# Patient Record
Sex: FEMALE | Race: WHITE | NOT HISPANIC OR LATINO | Employment: OTHER | URBAN - METROPOLITAN AREA
[De-identification: names, ages, dates, MRNs, and addresses within clinical notes are randomized per-mention and may not be internally consistent; named-entity substitution may affect disease eponyms.]

---

## 2017-03-21 ENCOUNTER — APPOINTMENT (OUTPATIENT)
Dept: LAB | Facility: CLINIC | Age: 82
End: 2017-03-21
Payer: MEDICARE

## 2017-03-21 ENCOUNTER — TRANSCRIBE ORDERS (OUTPATIENT)
Dept: LAB | Facility: CLINIC | Age: 82
End: 2017-03-21

## 2017-03-21 DIAGNOSIS — I10 ESSENTIAL HYPERTENSION, BENIGN: Primary | ICD-10-CM

## 2017-03-21 DIAGNOSIS — E55.9 UNSPECIFIED VITAMIN D DEFICIENCY: ICD-10-CM

## 2017-03-21 DIAGNOSIS — T45.1X5A ANEMIA DUE TO CHEMOTHERAPY: ICD-10-CM

## 2017-03-21 DIAGNOSIS — D64.81 ANEMIA DUE TO CHEMOTHERAPY: ICD-10-CM

## 2017-03-21 LAB
25(OH)D3 SERPL-MCNC: 12.5 NG/ML (ref 30–100)
ALT SERPL W P-5'-P-CCNC: 16 U/L (ref 12–78)
ANION GAP SERPL CALCULATED.3IONS-SCNC: 6 MMOL/L (ref 4–13)
BUN SERPL-MCNC: 21 MG/DL (ref 5–25)
CALCIUM SERPL-MCNC: 8.9 MG/DL (ref 8.3–10.1)
CHLORIDE SERPL-SCNC: 106 MMOL/L (ref 100–108)
CO2 SERPL-SCNC: 28 MMOL/L (ref 21–32)
CREAT SERPL-MCNC: 1.2 MG/DL (ref 0.6–1.3)
GFR SERPL CREATININE-BSD FRML MDRD: 42.8 ML/MIN/1.73SQ M
GLUCOSE P FAST SERPL-MCNC: 95 MG/DL (ref 65–99)
HCT VFR BLD AUTO: 36.3 % (ref 34.8–46.1)
HGB BLD-MCNC: 12.1 G/DL (ref 11.5–15.4)
POTASSIUM SERPL-SCNC: 4.2 MMOL/L (ref 3.5–5.3)
SODIUM SERPL-SCNC: 140 MMOL/L (ref 136–145)

## 2017-03-21 PROCEDURE — 82306 VITAMIN D 25 HYDROXY: CPT

## 2017-03-21 PROCEDURE — 80048 BASIC METABOLIC PNL TOTAL CA: CPT

## 2017-03-21 PROCEDURE — 85014 HEMATOCRIT: CPT

## 2017-03-21 PROCEDURE — 36415 COLL VENOUS BLD VENIPUNCTURE: CPT

## 2017-03-21 PROCEDURE — 84460 ALANINE AMINO (ALT) (SGPT): CPT

## 2017-03-21 PROCEDURE — 85018 HEMOGLOBIN: CPT

## 2017-07-10 ENCOUNTER — APPOINTMENT (OUTPATIENT)
Dept: LAB | Facility: CLINIC | Age: 82
End: 2017-07-10
Payer: MEDICARE

## 2017-07-10 ENCOUNTER — TRANSCRIBE ORDERS (OUTPATIENT)
Dept: LAB | Facility: CLINIC | Age: 82
End: 2017-07-10

## 2017-07-10 DIAGNOSIS — D64.9 ANEMIA, UNSPECIFIED: ICD-10-CM

## 2017-07-10 DIAGNOSIS — I10 ESSENTIAL HYPERTENSION, BENIGN: ICD-10-CM

## 2017-07-10 DIAGNOSIS — J44.9 OBSTRUCTIVE CHRONIC BRONCHITIS WITHOUT EXACERBATION (HCC): ICD-10-CM

## 2017-07-10 DIAGNOSIS — E78.2 MIXED HYPERLIPIDEMIA: Primary | ICD-10-CM

## 2017-07-10 LAB
ALBUMIN SERPL BCP-MCNC: 3.6 G/DL (ref 3.5–5)
ALP SERPL-CCNC: 70 U/L (ref 46–116)
ALT SERPL W P-5'-P-CCNC: 17 U/L (ref 12–78)
ANION GAP SERPL CALCULATED.3IONS-SCNC: 4 MMOL/L (ref 4–13)
AST SERPL W P-5'-P-CCNC: 13 U/L (ref 5–45)
BILIRUB SERPL-MCNC: 0.61 MG/DL (ref 0.2–1)
BUN SERPL-MCNC: 19 MG/DL (ref 5–25)
CALCIUM SERPL-MCNC: 8.7 MG/DL (ref 8.3–10.1)
CHLORIDE SERPL-SCNC: 106 MMOL/L (ref 100–108)
CHOLEST SERPL-MCNC: 210 MG/DL (ref 50–200)
CO2 SERPL-SCNC: 27 MMOL/L (ref 21–32)
CREAT SERPL-MCNC: 1.23 MG/DL (ref 0.6–1.3)
EST. AVERAGE GLUCOSE BLD GHB EST-MCNC: 126 MG/DL
GFR SERPL CREATININE-BSD FRML MDRD: 41.6 ML/MIN/1.73SQ M
GLUCOSE P FAST SERPL-MCNC: 111 MG/DL (ref 65–99)
HBA1C MFR BLD: 6 % (ref 4.2–6.3)
HCT VFR BLD AUTO: 37.6 % (ref 34.8–46.1)
HDLC SERPL-MCNC: 60 MG/DL (ref 40–60)
HGB BLD-MCNC: 12.5 G/DL (ref 11.5–15.4)
LDLC SERPL CALC-MCNC: 118 MG/DL (ref 0–100)
POTASSIUM SERPL-SCNC: 4 MMOL/L (ref 3.5–5.3)
PROT SERPL-MCNC: 7.7 G/DL (ref 6.4–8.2)
SODIUM SERPL-SCNC: 137 MMOL/L (ref 136–145)
TRIGL SERPL-MCNC: 158 MG/DL

## 2017-07-10 PROCEDURE — 85018 HEMOGLOBIN: CPT

## 2017-07-10 PROCEDURE — 80053 COMPREHEN METABOLIC PANEL: CPT

## 2017-07-10 PROCEDURE — 36415 COLL VENOUS BLD VENIPUNCTURE: CPT

## 2017-07-10 PROCEDURE — 85014 HEMATOCRIT: CPT

## 2017-07-10 PROCEDURE — 80061 LIPID PANEL: CPT

## 2017-07-10 PROCEDURE — 83036 HEMOGLOBIN GLYCOSYLATED A1C: CPT

## 2018-08-28 ENCOUNTER — TRANSCRIBE ORDERS (OUTPATIENT)
Dept: LAB | Facility: CLINIC | Age: 83
End: 2018-08-28

## 2018-08-28 ENCOUNTER — APPOINTMENT (OUTPATIENT)
Dept: LAB | Facility: CLINIC | Age: 83
End: 2018-08-28
Payer: MEDICARE

## 2018-08-28 DIAGNOSIS — I51.9 MYXEDEMA HEART DISEASE: ICD-10-CM

## 2018-08-28 DIAGNOSIS — R73.09 IMPAIRED GLUCOSE TOLERANCE TEST: ICD-10-CM

## 2018-08-28 DIAGNOSIS — E03.9 MYXEDEMA HEART DISEASE: ICD-10-CM

## 2018-08-28 DIAGNOSIS — D63.8 CHRONIC DISEASE ANEMIA: ICD-10-CM

## 2018-08-28 DIAGNOSIS — I10 ESSENTIAL HYPERTENSION, MALIGNANT: Primary | ICD-10-CM

## 2018-08-28 LAB
ALBUMIN SERPL BCP-MCNC: 3.6 G/DL (ref 3.5–5)
ALP SERPL-CCNC: 60 U/L (ref 46–116)
ALT SERPL W P-5'-P-CCNC: 18 U/L (ref 12–78)
ANION GAP SERPL CALCULATED.3IONS-SCNC: 7 MMOL/L (ref 4–13)
AST SERPL W P-5'-P-CCNC: 14 U/L (ref 5–45)
BILIRUB SERPL-MCNC: 0.66 MG/DL (ref 0.2–1)
BUN SERPL-MCNC: 26 MG/DL (ref 5–25)
CALCIUM SERPL-MCNC: 8.9 MG/DL (ref 8.3–10.1)
CHLORIDE SERPL-SCNC: 104 MMOL/L (ref 100–108)
CHOLEST SERPL-MCNC: 205 MG/DL (ref 50–200)
CO2 SERPL-SCNC: 25 MMOL/L (ref 21–32)
CREAT SERPL-MCNC: 1.25 MG/DL (ref 0.6–1.3)
GFR SERPL CREATININE-BSD FRML MDRD: 39 ML/MIN/1.73SQ M
GLUCOSE P FAST SERPL-MCNC: 118 MG/DL (ref 65–99)
HCT VFR BLD AUTO: 41.1 % (ref 34.8–46.1)
HDLC SERPL-MCNC: 59 MG/DL (ref 40–60)
HGB BLD-MCNC: 13.1 G/DL (ref 11.5–15.4)
LDLC SERPL CALC-MCNC: 117 MG/DL (ref 0–100)
NONHDLC SERPL-MCNC: 146 MG/DL
POTASSIUM SERPL-SCNC: 4.3 MMOL/L (ref 3.5–5.3)
PROT SERPL-MCNC: 7.7 G/DL (ref 6.4–8.2)
SODIUM SERPL-SCNC: 136 MMOL/L (ref 136–145)
TRIGL SERPL-MCNC: 147 MG/DL
TSH SERPL DL<=0.05 MIU/L-ACNC: 1.36 UIU/ML (ref 0.36–3.74)

## 2018-08-28 PROCEDURE — 84443 ASSAY THYROID STIM HORMONE: CPT

## 2018-08-28 PROCEDURE — 80053 COMPREHEN METABOLIC PANEL: CPT

## 2018-08-28 PROCEDURE — 80061 LIPID PANEL: CPT

## 2018-08-28 PROCEDURE — 36415 COLL VENOUS BLD VENIPUNCTURE: CPT

## 2018-08-28 PROCEDURE — 85018 HEMOGLOBIN: CPT

## 2018-08-28 PROCEDURE — 85014 HEMATOCRIT: CPT

## 2019-07-23 ENCOUNTER — APPOINTMENT (OUTPATIENT)
Dept: LAB | Facility: CLINIC | Age: 84
End: 2019-07-23
Payer: MEDICARE

## 2019-07-23 ENCOUNTER — TRANSCRIBE ORDERS (OUTPATIENT)
Dept: LAB | Facility: CLINIC | Age: 84
End: 2019-07-23

## 2019-07-23 DIAGNOSIS — I10 ESSENTIAL HYPERTENSION, MALIGNANT: Primary | ICD-10-CM

## 2019-07-23 DIAGNOSIS — E78.00 PURE HYPERCHOLESTEROLEMIA: ICD-10-CM

## 2019-07-23 DIAGNOSIS — D50.0 IRON DEFICIENCY ANEMIA SECONDARY TO BLOOD LOSS (CHRONIC): ICD-10-CM

## 2019-07-23 DIAGNOSIS — E13.8 DIABETES MELLITUS OF OTHER TYPE WITH COMPLICATION, UNSPECIFIED WHETHER LONG TERM INSULIN USE: ICD-10-CM

## 2019-07-23 LAB
ALBUMIN SERPL BCP-MCNC: 3.7 G/DL (ref 3.5–5)
ALP SERPL-CCNC: 57 U/L (ref 46–116)
ALT SERPL W P-5'-P-CCNC: 15 U/L (ref 12–78)
ANION GAP SERPL CALCULATED.3IONS-SCNC: 5 MMOL/L (ref 4–13)
AST SERPL W P-5'-P-CCNC: 12 U/L (ref 5–45)
BILIRUB SERPL-MCNC: 0.68 MG/DL (ref 0.2–1)
BUN SERPL-MCNC: 21 MG/DL (ref 5–25)
CALCIUM SERPL-MCNC: 9 MG/DL (ref 8.3–10.1)
CHLORIDE SERPL-SCNC: 103 MMOL/L (ref 100–108)
CHOLEST SERPL-MCNC: 191 MG/DL (ref 50–200)
CO2 SERPL-SCNC: 28 MMOL/L (ref 21–32)
CREAT SERPL-MCNC: 1.05 MG/DL (ref 0.6–1.3)
CREAT UR-MCNC: 32 MG/DL
EST. AVERAGE GLUCOSE BLD GHB EST-MCNC: 117 MG/DL
GFR SERPL CREATININE-BSD FRML MDRD: 48 ML/MIN/1.73SQ M
GLUCOSE P FAST SERPL-MCNC: 111 MG/DL (ref 65–99)
HBA1C MFR BLD: 5.7 % (ref 4.2–6.3)
HDLC SERPL-MCNC: 78 MG/DL (ref 40–60)
HGB BLD-MCNC: 12.9 G/DL (ref 11.5–15.4)
LDLC SERPL CALC-MCNC: 96 MG/DL (ref 0–100)
MICROALBUMIN UR-MCNC: <5 MG/L (ref 0–20)
MICROALBUMIN/CREAT 24H UR: <16 MG/G CREATININE (ref 0–30)
NONHDLC SERPL-MCNC: 113 MG/DL
POTASSIUM SERPL-SCNC: 4.2 MMOL/L (ref 3.5–5.3)
PROT SERPL-MCNC: 7.3 G/DL (ref 6.4–8.2)
SODIUM SERPL-SCNC: 136 MMOL/L (ref 136–145)
TRIGL SERPL-MCNC: 83 MG/DL

## 2019-07-23 PROCEDURE — 85018 HEMOGLOBIN: CPT

## 2019-07-23 PROCEDURE — 80061 LIPID PANEL: CPT

## 2019-07-23 PROCEDURE — 36415 COLL VENOUS BLD VENIPUNCTURE: CPT

## 2019-07-23 PROCEDURE — 83036 HEMOGLOBIN GLYCOSYLATED A1C: CPT

## 2019-07-23 PROCEDURE — 82570 ASSAY OF URINE CREATININE: CPT

## 2019-07-23 PROCEDURE — 82043 UR ALBUMIN QUANTITATIVE: CPT

## 2019-07-23 PROCEDURE — 80053 COMPREHEN METABOLIC PANEL: CPT

## 2019-10-07 PROBLEM — J31.0 CHRONIC RHINITIS: Status: ACTIVE | Noted: 2019-10-07

## 2019-10-07 PROBLEM — N18.30 CKD (CHRONIC KIDNEY DISEASE) STAGE 3, GFR 30-59 ML/MIN (HCC): Status: ACTIVE | Noted: 2019-10-07

## 2019-10-07 PROBLEM — E55.9 VITAMIN D DEFICIENCY: Status: ACTIVE | Noted: 2019-10-07

## 2019-10-07 PROBLEM — M15.9 DJD (DEGENERATIVE JOINT DISEASE), MULTIPLE SITES: Status: ACTIVE | Noted: 2019-10-07

## 2019-10-07 PROBLEM — E78.00 HYPERCHOLESTEREMIA: Status: ACTIVE | Noted: 2019-10-07

## 2019-10-07 PROBLEM — R76.8 ELEVATED RHEUMATOID FACTOR: Status: ACTIVE | Noted: 2019-10-07

## 2019-10-07 PROBLEM — H90.3 SENSORINEURAL HEARING LOSS (SNHL) OF BOTH EARS: Status: ACTIVE | Noted: 2019-10-07

## 2019-10-07 PROBLEM — E74.39 GLUCOSE INTOLERANCE: Status: ACTIVE | Noted: 2019-10-07

## 2019-10-07 PROBLEM — D63.8 ANEMIA, CHRONIC DISEASE: Status: ACTIVE | Noted: 2019-10-07

## 2019-10-25 PROBLEM — J06.9 VIRAL UPPER RESPIRATORY TRACT INFECTION: Status: ACTIVE | Noted: 2019-10-25

## 2019-10-25 PROBLEM — J45.41 MODERATE PERSISTENT ASTHMA WITH ACUTE EXACERBATION: Status: ACTIVE | Noted: 2019-10-25

## 2020-05-18 PROBLEM — I10 ESSENTIAL HYPERTENSION: Status: ACTIVE | Noted: 2020-05-18

## 2020-05-18 PROBLEM — R48.1: Status: ACTIVE | Noted: 2020-05-18

## 2020-05-18 PROBLEM — J06.9 VIRAL UPPER RESPIRATORY TRACT INFECTION: Status: RESOLVED | Noted: 2019-10-25 | Resolved: 2020-05-18

## 2020-12-04 LAB — HBA1C MFR BLD HPLC: 5.9 %

## 2021-02-05 ENCOUNTER — TELEMEDICINE (OUTPATIENT)
Dept: INTERNAL MEDICINE CLINIC | Facility: CLINIC | Age: 86
End: 2021-02-05
Payer: MEDICARE

## 2021-02-05 VITALS
SYSTOLIC BLOOD PRESSURE: 150 MMHG | HEIGHT: 60 IN | RESPIRATION RATE: 18 BRPM | BODY MASS INDEX: 28.47 KG/M2 | DIASTOLIC BLOOD PRESSURE: 60 MMHG | HEART RATE: 65 BPM | WEIGHT: 145 LBS

## 2021-02-05 DIAGNOSIS — J45.41 MODERATE PERSISTENT ASTHMA WITH ACUTE EXACERBATION: ICD-10-CM

## 2021-02-05 DIAGNOSIS — J31.0 CHRONIC RHINITIS: ICD-10-CM

## 2021-02-05 DIAGNOSIS — R26.9 NEUROLOGIC GAIT DYSFUNCTION: ICD-10-CM

## 2021-02-05 DIAGNOSIS — I10 ESSENTIAL HYPERTENSION: Primary | ICD-10-CM

## 2021-02-05 DIAGNOSIS — H90.3 SENSORINEURAL HEARING LOSS (SNHL) OF BOTH EARS: ICD-10-CM

## 2021-02-05 DIAGNOSIS — D63.8 ANEMIA, CHRONIC DISEASE: ICD-10-CM

## 2021-02-05 DIAGNOSIS — S72.002D CLOSED FRACTURE OF LEFT HIP WITH ROUTINE HEALING: ICD-10-CM

## 2021-02-05 DIAGNOSIS — M80.00XD AGE-RELATED OSTEOPOROSIS WITH CURRENT PATHOLOGICAL FRACTURE WITH ROUTINE HEALING, SUBSEQUENT ENCOUNTER: ICD-10-CM

## 2021-02-05 DIAGNOSIS — N18.30 STAGE 3 CHRONIC KIDNEY DISEASE, UNSPECIFIED WHETHER STAGE 3A OR 3B CKD (HCC): ICD-10-CM

## 2021-02-05 PROBLEM — M80.00XA AGE-RELATED OSTEOPOROSIS WITH CURRENT PATHOLOGICAL FRACTURE: Status: ACTIVE | Noted: 2021-02-05

## 2021-02-05 PROCEDURE — 99442 PR PHYS/QHP TELEPHONE EVALUATION 11-20 MIN: CPT | Performed by: INTERNAL MEDICINE

## 2021-02-05 RX ORDER — MELATONIN
2000 DAILY
COMMUNITY

## 2021-02-05 NOTE — PROGRESS NOTES
Virtual Brief Visit    Assessment/Plan:generally fair s/p hip fracture, recovering, HTN fair BS fair Chol for diet  Asthma stable, generally fair  Coming back to this area in 2 months    Advised to get copy of blood test report from the her MD there    rx for osteoporosis disucssed    Problem List Items Addressed This Visit        Cardiovascular and Mediastinum    Essential hypertension - Primary     BP was high, now better            Nervous and Auditory    Sensorineural hearing loss (SNHL) of both ears       Musculoskeletal and Integument    Age-related osteoporosis with current pathological fracture     Rec calcium 1000 mg daily and vit d3 2000 units daily         Closed fracture of left hip with routine healing     Continue Therapy            Genitourinary    CKD (chronic kidney disease) stage 3, GFR 30-59 ml/min       Other    Neurologic gait dysfunction     Pt is getting therapy at home for 6 months and will be going for outpatient                     Reason for visit is   Chief Complaint   Patient presents with    Hypertension    Hyperlipidemia    Diabetes    Virtual Brief Visit        Encounter provider Leonard Sanders MD    Provider located at [address] [id number]    Recent Visits  No visits were found meeting these conditions  Showing recent visits within past 7 days and meeting all other requirements     Today's Visits  Date Type Provider Dept   02/05/21 Telemedicine Leonard Sanders MD [facility] Internal Med   Showing today's visits and meeting all other requirements     Future Appointments  No visits were found meeting these conditions  Showing future appointments within next 150 days and meeting all other requirements        After connecting through telephone, the patient was identified by name and date of birth   Jayro Elyola was informed that this is a telemedicine visit and that the visit is being conducted through telephone  My office door was closed  No one else was in the room  She acknowledged consent and understanding of privacy and security of the platform  The patient has agreed to participate and understands she can discontinue the visit at any time  Patient is aware this is a billable service  Subjective    Leta Maya is a 80 y o  female chronic disease management  HPI     History reviewed  No pertinent past medical history  History reviewed  No pertinent surgical history  Current Outpatient Medications   Medication Sig Dispense Refill    ADVAIR DISKUS 250-50 MCG/DOSE inhaler USE 1 PUFF BY MOUTH TWO  TIMES DAILY 180 each 1    ALPRAZolam (XANAX) 0 5 mg tablet Take 1 tablet (0 5 mg total) by mouth 2 (two) times a day as needed for anxiety 180 tablet 1    cholecalciferol (VITAMIN D3) 1,000 units tablet Take 2,000 Units by mouth daily      lisinopril (ZESTRIL) 20 mg tablet Take 1 tablet (20 mg total) by mouth daily 90 tablet 1    metoprolol tartrate (LOPRESSOR) 100 mg tablet TAKE 1 TABLET BY MOUTH  EVERY 12 HOURS (Patient taking differently: Half in AM and One in PM ( 50 mg daily AM and 100 mg daily PM)) 180 tablet 1    montelukast (SINGULAIR) 10 mg tablet Take 1 tablet (10 mg total) by mouth daily at bedtime 90 tablet 1     No current facility-administered medications for this visit  No Known Allergies    Review of Systems    Vitals:    02/05/21 1337   BP: 150/60   Pulse: 65   Resp: 18   Weight: 65 8 kg (145 lb)   Height: 5' (1 524 m)         I spent 15 minutes directly with the patient during this visit    VIRTUAL VISIT DISCLAIMER    Leta Maya acknowledges that she has consented to an online visit or consultation   She understands that the online visit is based solely on information provided by her, and that, in the absence of a face-to-face physical evaluation by the physician, the diagnosis she receives is both limited and provisional in terms of accuracy and completeness  This is not intended to replace a full medical face-to-face evaluation by the physician  Mary Johnson understands and accepts these terms

## 2021-05-17 DIAGNOSIS — I10 ESSENTIAL HYPERTENSION, MALIGNANT: Primary | ICD-10-CM

## 2021-05-17 DIAGNOSIS — R73.02 IMPAIRED GLUCOSE TOLERANCE ASSOCIATED WITH DRUGS: ICD-10-CM

## 2021-05-17 DIAGNOSIS — E78.00 PURE HYPERCHOLESTEROLEMIA: ICD-10-CM

## 2021-05-17 DIAGNOSIS — T50.905A IMPAIRED GLUCOSE TOLERANCE ASSOCIATED WITH DRUGS: ICD-10-CM

## 2021-05-19 ENCOUNTER — APPOINTMENT (OUTPATIENT)
Dept: LAB | Facility: CLINIC | Age: 86
End: 2021-05-19
Payer: MEDICARE

## 2021-05-19 DIAGNOSIS — R73.02 IMPAIRED GLUCOSE TOLERANCE ASSOCIATED WITH DRUGS: ICD-10-CM

## 2021-05-19 DIAGNOSIS — I10 ESSENTIAL HYPERTENSION, MALIGNANT: ICD-10-CM

## 2021-05-19 DIAGNOSIS — T50.905A IMPAIRED GLUCOSE TOLERANCE ASSOCIATED WITH DRUGS: ICD-10-CM

## 2021-05-19 DIAGNOSIS — E78.00 PURE HYPERCHOLESTEROLEMIA: ICD-10-CM

## 2021-05-19 LAB
ALBUMIN SERPL BCP-MCNC: 3.8 G/DL (ref 3.5–5)
ALP SERPL-CCNC: 66 U/L (ref 46–116)
ALT SERPL W P-5'-P-CCNC: 15 U/L (ref 12–78)
ANION GAP SERPL CALCULATED.3IONS-SCNC: 5 MMOL/L (ref 4–13)
AST SERPL W P-5'-P-CCNC: 10 U/L (ref 5–45)
BILIRUB SERPL-MCNC: 0.66 MG/DL (ref 0.2–1)
BUN SERPL-MCNC: 29 MG/DL (ref 5–25)
CALCIUM SERPL-MCNC: 9.6 MG/DL (ref 8.3–10.1)
CHLORIDE SERPL-SCNC: 107 MMOL/L (ref 100–108)
CHOLEST SERPL-MCNC: 229 MG/DL (ref 50–200)
CO2 SERPL-SCNC: 27 MMOL/L (ref 21–32)
CREAT SERPL-MCNC: 0.99 MG/DL (ref 0.6–1.3)
EST. AVERAGE GLUCOSE BLD GHB EST-MCNC: 114 MG/DL
GFR SERPL CREATININE-BSD FRML MDRD: 51 ML/MIN/1.73SQ M
GLUCOSE P FAST SERPL-MCNC: 96 MG/DL (ref 65–99)
HBA1C MFR BLD: 5.6 %
HDLC SERPL-MCNC: 75 MG/DL
LDLC SERPL CALC-MCNC: 123 MG/DL (ref 0–100)
POTASSIUM SERPL-SCNC: 4.7 MMOL/L (ref 3.5–5.3)
PROT SERPL-MCNC: 7.8 G/DL (ref 6.4–8.2)
SODIUM SERPL-SCNC: 139 MMOL/L (ref 136–145)
TRIGL SERPL-MCNC: 155 MG/DL

## 2021-05-19 PROCEDURE — 83036 HEMOGLOBIN GLYCOSYLATED A1C: CPT

## 2021-05-19 PROCEDURE — 36415 COLL VENOUS BLD VENIPUNCTURE: CPT

## 2021-05-19 PROCEDURE — 80053 COMPREHEN METABOLIC PANEL: CPT

## 2021-05-19 PROCEDURE — 80061 LIPID PANEL: CPT

## 2021-05-25 ENCOUNTER — OFFICE VISIT (OUTPATIENT)
Dept: INTERNAL MEDICINE CLINIC | Facility: CLINIC | Age: 86
End: 2021-05-25
Payer: MEDICARE

## 2021-05-25 VITALS
BODY MASS INDEX: 30.23 KG/M2 | WEIGHT: 154 LBS | HEART RATE: 62 BPM | OXYGEN SATURATION: 99 % | HEIGHT: 60 IN | DIASTOLIC BLOOD PRESSURE: 60 MMHG | SYSTOLIC BLOOD PRESSURE: 152 MMHG | TEMPERATURE: 97.3 F

## 2021-05-25 DIAGNOSIS — N18.31 STAGE 3A CHRONIC KIDNEY DISEASE (HCC): ICD-10-CM

## 2021-05-25 DIAGNOSIS — E55.9 VITAMIN D DEFICIENCY: ICD-10-CM

## 2021-05-25 DIAGNOSIS — H90.3 SENSORINEURAL HEARING LOSS (SNHL) OF BOTH EARS: Primary | ICD-10-CM

## 2021-05-25 DIAGNOSIS — J45.40 MODERATE PERSISTENT ASTHMA, UNSPECIFIED WHETHER COMPLICATED: ICD-10-CM

## 2021-05-25 DIAGNOSIS — K21.9 GASTROESOPHAGEAL REFLUX DISEASE WITHOUT ESOPHAGITIS: ICD-10-CM

## 2021-05-25 DIAGNOSIS — R76.8 ELEVATED RHEUMATOID FACTOR: ICD-10-CM

## 2021-05-25 DIAGNOSIS — M15.9 PRIMARY OSTEOARTHRITIS INVOLVING MULTIPLE JOINTS: ICD-10-CM

## 2021-05-25 DIAGNOSIS — F41.1 GAD (GENERALIZED ANXIETY DISORDER): ICD-10-CM

## 2021-05-25 DIAGNOSIS — E78.00 HYPERCHOLESTEREMIA: ICD-10-CM

## 2021-05-25 DIAGNOSIS — E74.39 GLUCOSE INTOLERANCE: ICD-10-CM

## 2021-05-25 DIAGNOSIS — J31.0 CHRONIC RHINITIS: ICD-10-CM

## 2021-05-25 DIAGNOSIS — I10 ESSENTIAL HYPERTENSION: ICD-10-CM

## 2021-05-25 PROBLEM — J45.909 MODERATE ASTHMA: Status: ACTIVE | Noted: 2021-05-25

## 2021-05-25 PROBLEM — J45.41 MODERATE PERSISTENT ASTHMA WITH ACUTE EXACERBATION: Status: RESOLVED | Noted: 2019-10-25 | Resolved: 2021-05-25

## 2021-05-25 PROCEDURE — 99214 OFFICE O/P EST MOD 30 MIN: CPT | Performed by: INTERNAL MEDICINE

## 2021-05-25 RX ORDER — LISINOPRIL 20 MG/1
20 TABLET ORAL DAILY
Qty: 90 TABLET | Refills: 1 | Status: SHIPPED | OUTPATIENT
Start: 2021-05-25 | End: 2021-10-04

## 2021-05-25 RX ORDER — METOPROLOL TARTRATE 100 MG/1
TABLET ORAL
COMMUNITY
Start: 2021-02-27 | End: 2021-05-25 | Stop reason: SDUPTHER

## 2021-05-25 RX ORDER — MONTELUKAST SODIUM 10 MG/1
10 TABLET ORAL
Qty: 90 TABLET | Refills: 1 | Status: SHIPPED | OUTPATIENT
Start: 2021-05-25 | End: 2021-10-04

## 2021-05-25 RX ORDER — ALPRAZOLAM 0.5 MG/1
0.5 TABLET ORAL 2 TIMES DAILY PRN
Qty: 180 TABLET | Refills: 1 | Status: SHIPPED | OUTPATIENT
Start: 2021-05-25

## 2021-05-25 RX ORDER — METOPROLOL TARTRATE 100 MG/1
TABLET ORAL
Qty: 135 TABLET | Refills: 1 | Status: SHIPPED | OUTPATIENT
Start: 2021-05-25 | End: 2021-10-04

## 2021-05-25 RX ORDER — AMLODIPINE BESYLATE 2.5 MG/1
2.5 TABLET ORAL DAILY
Qty: 90 TABLET | Refills: 1 | Status: SHIPPED | OUTPATIENT
Start: 2021-05-25 | End: 2021-06-24 | Stop reason: DRUGHIGH

## 2021-05-25 NOTE — ASSESSMENT & PLAN NOTE
Generalized anxiety chronic fair tolerating Xanax without side effect needs further quality of life continue

## 2021-05-25 NOTE — ASSESSMENT & PLAN NOTE
Suboptimal control of the systolic blood pressure blood pressure here is 152/60    We will add small dose of amlodipine 2 5 mg daily is will continue lisinopril 20 mg daily as well as metoprolol tartrate 100 mg 1 in the morning and half in the evening

## 2021-05-25 NOTE — ASSESSMENT & PLAN NOTE
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

## 2021-05-25 NOTE — PATIENT INSTRUCTIONS
Follow with Consultants as per their and our suggestion    Follow up in one month or as needed earlier    Follow all instructions as advised and discussed  Take your medications as prescribed  Call the office immediately if you experience any side effects  Ask questions if you do not understand  Keep your scheduled appointment as advised or come sooner if necessary or in doubt  Best time to call for non-urgent matter or questions on weekdays is between 9am and 12 noon  See physician for any new symptoms or worsening of current symptoms  Urgent or emergent situations call 911 and report to nearest emergency room      I spent  30 -40 minutes taking care of this patient including clinical care, conseling, collaboration, chart, lab and consultaion review as appropriate    Patient is to get labs 1 week(s) prior to next visit if advised      Add amlodipine 2 5 mg 1 daily in the morning

## 2021-05-25 NOTE — ASSESSMENT & PLAN NOTE
Allergic rhinitis fair control of symptoms    Recommend : 1  Avoidance of allergan that gives you allergic symptom  2  Saline nasal spray three times a day as needed  3   Nasal [place] and/or Oral Medicine for allergy as advised or prescribed as appropriate    Allergy symptoms more symptomatic this time of the year but symptoms are well controlled patient also remains Singulair 10 mg daily

## 2021-05-25 NOTE — ASSESSMENT & PLAN NOTE
Lab Results   Component Value Date    EGFR 51 05/19/2021    EGFR 48 07/23/2019    EGFR 39 08/28/2018    CREATININE 0 99 05/19/2021    CREATININE 1 05 07/23/2019    CREATININE 1 25 08/28/2018   GFR is baseline  Creat is baseline  Recommend periodic blood test for monitoring of BUN and Creat  Avoid Non Steroidal Antiinflammatory such as ibuprofen, aleve etc   Potassium, HCO3 and calcium are wnl and will require periodic blood test   Bone and Mineral disease monitoring as appropriate    All patient with GFR less than 30( CKD 4 or 5 or 6) advised to follow with nephrologist

## 2021-06-14 ENCOUNTER — OFFICE VISIT (OUTPATIENT)
Dept: PODIATRY | Facility: CLINIC | Age: 86
End: 2021-06-14
Payer: MEDICARE

## 2021-06-14 VITALS
BODY MASS INDEX: 30.23 KG/M2 | HEART RATE: 62 BPM | HEIGHT: 60 IN | WEIGHT: 154 LBS | RESPIRATION RATE: 17 BRPM | DIASTOLIC BLOOD PRESSURE: 60 MMHG | SYSTOLIC BLOOD PRESSURE: 152 MMHG

## 2021-06-14 DIAGNOSIS — M79.671 PAIN IN BOTH FEET: ICD-10-CM

## 2021-06-14 DIAGNOSIS — M79.672 PAIN IN BOTH FEET: ICD-10-CM

## 2021-06-14 DIAGNOSIS — I73.9 PERIPHERAL VASCULAR DISEASE, UNSPECIFIED (HCC): ICD-10-CM

## 2021-06-14 DIAGNOSIS — B35.1 ONYCHOMYCOSIS: Primary | ICD-10-CM

## 2021-06-14 PROCEDURE — 99203 OFFICE O/P NEW LOW 30 MIN: CPT | Performed by: PODIATRIST

## 2021-06-24 ENCOUNTER — OFFICE VISIT (OUTPATIENT)
Dept: INTERNAL MEDICINE CLINIC | Facility: CLINIC | Age: 86
End: 2021-06-24
Payer: MEDICARE

## 2021-06-24 VITALS
BODY MASS INDEX: 30.23 KG/M2 | HEIGHT: 60 IN | WEIGHT: 154 LBS | TEMPERATURE: 96 F | SYSTOLIC BLOOD PRESSURE: 140 MMHG | DIASTOLIC BLOOD PRESSURE: 60 MMHG | HEART RATE: 86 BPM | OXYGEN SATURATION: 86 %

## 2021-06-24 DIAGNOSIS — I10 ESSENTIAL HYPERTENSION: Primary | ICD-10-CM

## 2021-06-24 PROCEDURE — 99213 OFFICE O/P EST LOW 20 MIN: CPT | Performed by: INTERNAL MEDICINE

## 2021-06-24 RX ORDER — AMLODIPINE BESYLATE 5 MG/1
5 TABLET ORAL DAILY
Qty: 90 TABLET | Refills: 1 | Status: SHIPPED | OUTPATIENT
Start: 2021-06-24

## 2021-06-24 NOTE — PROGRESS NOTES
[facility] Office Visit Note  21     Luci Mandujano 80 y o  female MRN: [id number]  : 1932    Assessment:     Essential hypertension  Continue metoprolol same as well as lisinopril 20 mg same  However I like to increase your amlodipine 5 mg every day    Hypertension( High Blood Pressure ):    Continue Home BP check daily and bring log, if you are not checking consider checking daily    Take your Blood Pressure medicine as advised    Do not take your Blood pressure medicine if systolic Blood Pressure (top number) is less than 100 or heart rate less than 60  Notify you physician  Diagnoses and all orders for this visit:    Essential hypertension  -     amLODIPine (NORVASC) 5 mg tablet; Take 1 tablet (5 mg total) by mouth daily          Discussion Summary and Plan: Today's care plan and medications were reviewed with patient in detail and all their questions answered to their satisfaction  In summary hypertension improving control  Will increase amlodipine 5 mg daily  Will continue lisinopril 20 mg daily and metoprolol 1 in the morning after the evening  Continue home blood pressure check  Back in 4 weeks  Chief Complaint   Patient presents with    Hypertension      Subjective:  Patient is  here for chronic disease management  She offers no specific complaint  Hypertension symptom-free remains on metoprolol tartrate 100 mg 1/2 tablet in the morning and 1 tablet in the evening  Lisinopril 20 mg daily and amlodipine 2 5 mg daily  Home blood pressure reviewed in the range of 130-150  Occasionally more than 160  Patient is symptom-free             Appointment on 2021  Sodium                    Value: 139(mmol/L)        Dt: 2021  Potassium                 Value: 4 7(mmol/L)        Dt: 2021  Chloride                  Value: 107(mmol/L)        Dt: 2021  CO2                       Value: 27(mmol/L)         Dt: 2021  ANION GAP                 Value: 5(mmol/L)          Dt: 05/19/2021  BUN                       Value: 29(mg/dL)*         Dt: 05/19/2021  Creatinine                Value: 0 99(mg/dL)        Dt: 05/19/2021  Glucose, Fasting          Value: 96(mg/dL)          Dt: 05/19/2021  Calcium                   Value: 9 6(mg/dL)         Dt: 05/19/2021  AST                       Value: 10(U/L)            Dt: 05/19/2021  ALT                       Value: 15(U/L)            Dt: 05/19/2021  Alkaline Phosphatase      Value: 66(U/L)            Dt: 05/19/2021  Total Protein             Value: 7 8(g/dL)          Dt: 05/19/2021  Albumin                   Value: 3 8(g/dL)          Dt: 05/19/2021  Total Bilirubin           Value: 0 66(mg/dL)        Dt: 05/19/2021  eGFR                      Value: 51(ml/min/1 73sq m) Dt: 05/19/2021  Cholesterol               Value: 229(mg/dL)*        Dt: 05/19/2021  Triglycerides             Value: 155(mg/dL)*        Dt: 05/19/2021  HDL, Direct               Value: 75(mg/dL)          Dt: 05/19/2021  LDL Calculated            Value: 123(mg/dL)*        Dt: 05/19/2021  Hemoglobin A1C            Value: 5 6(%)             Dt: 05/19/2021  EAG                       Value: 114(mg/dl)         Dt: 05/19/2021  ------------ - 2 month labs        The following portions of the patient's history were reviewed and updated as appropriate: allergies, current medications, past family history, past medical history, past social history, past surgical history and problem list     Review of Systems   All other systems reviewed and are negative          Historical Information   Patient Active Problem List   Diagnosis    Primary osteoarthritis of left shoulder    IMELDA (generalized anxiety disorder)    Benign skin lesion of neck    Esophageal reflux    Idiopathic osteoporosis    Elevated rheumatoid factor    Sensorineural hearing loss (SNHL) of both ears    Anemia, chronic disease    Chronic rhinitis    DJD (degenerative joint disease), multiple sites    Glucose intolerance    Hypercholesteremia    Vitamin D deficiency    CKD (chronic kidney disease) stage 3, GFR 30-59 ml/min ([facility])    Time agnosia    Essential hypertension    Neurologic gait dysfunction    Age-related osteoporosis with current pathological fracture    Closed fracture of left hip with routine healing    Moderate asthma     History reviewed  No pertinent past medical history  History reviewed  No pertinent surgical history  Social History     Substance and Sexual Activity   Alcohol Use None     Social History     Substance and Sexual Activity   Drug Use Not on file     Social History     Tobacco Use   Smoking Status Former Smoker   Smokeless Tobacco Never Used     History reviewed  No pertinent family history  Health Maintenance Due   Topic    COVID-19 Vaccine (1)    DTaP,Tdap,and Td Vaccines (1 - Tdap)    Influenza Vaccine (Season Ended)      Meds/Allergies       Current Outpatient Medications:     Advair Diskus 250-50 MCG/DOSE inhaler, Use twice daily  Rinse moth after use, Disp: 180 each, Rfl: 1    ALPRAZolam (XANAX) 0 5 mg tablet, Take 1 tablet (0 5 mg total) by mouth 2 (two) times a day as needed for anxiety, Disp: 180 tablet, Rfl: 1    cholecalciferol (VITAMIN D3) 1,000 units tablet, Take 2,000 Units by mouth daily, Disp: , Rfl:     lisinopril (ZESTRIL) 20 mg tablet, Take 1 tablet (20 mg total) by mouth daily, Disp: 90 tablet, Rfl: 1    metoprolol tartrate (LOPRESSOR) 100 mg tablet, Take half tab in the am and one tab in the pm, Disp: 135 tablet, Rfl: 1    montelukast (SINGULAIR) 10 mg tablet, Take 1 tablet (10 mg total) by mouth daily at bedtime, Disp: 90 tablet, Rfl: 1    amLODIPine (NORVASC) 5 mg tablet, Take 1 tablet (5 mg total) by mouth daily, Disp: 90 tablet, Rfl: 1      Objective:    Vitals:   /60   Pulse 86   Temp (!) 96 °F (35 6 °C)   Ht 5' (1 524 m)   Wt 69 9 kg (154 lb)   SpO2 (!) 86%   BMI 30 08 kg/m²   Body mass index is 30 08 kg/m²    Vitals: 06/24/21 1105   Weight: 69 9 kg (154 lb)       Physical Exam  Vitals and nursing note reviewed  Constitutional:       Appearance: She is well-developed  Comments: Grey hair and short hair   HENT:      Head: Normocephalic and atraumatic  Right Ear: External ear normal       Left Ear: External ear normal    Eyes:      Conjunctiva/sclera: Conjunctivae normal    Neck:      Thyroid: No thyromegaly  Vascular: No JVD  Cardiovascular:      Rate and Rhythm: Regular rhythm  Heart sounds: Murmur heard  Pulmonary:      Effort: No respiratory distress  Breath sounds: Normal breath sounds  No wheezing or rales  Abdominal:      General: Bowel sounds are normal  There is no distension  Palpations: There is no mass  Tenderness: There is no abdominal tenderness  There is no rebound  Musculoskeletal:      Left knee: Swelling and deformity present  No bony tenderness  Decreased range of motion  No MCL laxity  Normal meniscus  Right lower leg: No edema  Left lower leg: No edema  Lymphadenopathy:      Cervical: No cervical adenopathy  Skin:     General: Skin is warm  Findings: No rash  Neurological:      General: No focal deficit present  Mental Status: She is oriented to person, place, and time  Psychiatric:         Mood and Affect: Mood is anxious  Mood is not depressed or elated  Affect is not labile, blunt, flat or tearful  Speech: Speech normal          Behavior: Behavior is not agitated, aggressive, withdrawn, hyperactive or combative  Thought Content: Thought content is not paranoid or delusional  Thought content does not include homicidal or suicidal ideation  Thought content does not include suicidal plan           Cognition and Memory: Cognition normal          Judgment: Judgment normal          Lab Review   Appointment on 05/19/2021   Component Date Value Ref Range Status    Sodium 05/19/2021 139  136 - 145 mmol/L Final    Potassium 05/19/2021 4 7  3 5 - 5 3 mmol/L Final    Chloride 05/19/2021 107  100 - 108 mmol/L Final    CO2 05/19/2021 27  21 - 32 mmol/L Final    ANION GAP 05/19/2021 5  4 - 13 mmol/L Final    BUN 05/19/2021 29* 5 - 25 mg/dL Final    Creatinine 05/19/2021 0 99  0 60 - 1 30 mg/dL Final    Standardized to IDMS reference method    Glucose, Fasting 05/19/2021 96  65 - 99 mg/dL Final    Specimen collection should occur prior to Sulfasalazine administration due to the potential for falsely depressed results  Specimen collection should occur prior to Sulfapyridine administration due to the potential for falsely elevated results   Calcium 05/19/2021 9 6  8 3 - 10 1 mg/dL Final    AST 05/19/2021 10  5 - 45 U/L Final    Specimen collection should occur prior to Sulfasalazine administration due to the potential for falsely depressed results   ALT 05/19/2021 15  12 - 78 U/L Final    Specimen collection should occur prior to Sulfasalazine and/or Sulfapyridine administration due to the potential for falsely depressed results   Alkaline Phosphatase 05/19/2021 66  46 - 116 U/L Final    Total Protein 05/19/2021 7 8  6 4 - 8 2 g/dL Final    Albumin 05/19/2021 3 8  3 5 - 5 0 g/dL Final    Total Bilirubin 05/19/2021 0 66  0 20 - 1 00 mg/dL Final    Use of this assay is not recommended for patients undergoing treatment with eltrombopag due to the potential for falsely elevated results      eGFR 05/19/2021 51  ml/min/1 73sq m Final    Cholesterol 05/19/2021 229* 50 - 200 mg/dL Final    Cholesterol:       Desirable         <200 mg/dl       Borderline         200-239 mg/dl       High              >239           Triglycerides 05/19/2021 155* <=150 mg/dL Final    Triglyceride:     Normal          <150 mg/dl     Borderline High 150-199 mg/dl     High            200-499 mg/dl        Very High       >499 mg/dl    Specimen collection should occur prior to N-Acetylcysteine or Metamizole administration due to the potential for falsely depressed results   HDL, Direct 05/19/2021 75  >=40 mg/dL Final    HDL Cholesterol:       Low     <41 mg/dL    LDL Calculated 05/19/2021 123* 0 - 100 mg/dL Final    This screening LDL is a calculated result  It does not have the accuracy of the Direct Measured LDL in the monitoring of patients with hyperlipidemia and/or statin therapy  Direct Measure LDL (CAN990) must be ordered separately in these patients  LDL Cholesterol:     Optimal           <100 mg/dl     Near Optimal      100-129 mg/dl     Above Optimal       Borderline High 130-159 mg/dl       High            160-189 mg/dl       Very High       >189 mg/dl           Hemoglobin A1C 05/19/2021 5 6  Normal 3 8-5 6%; PreDiabetic 5 7-6 4%; Diabetic >=6 5%; Glycemic control for adults with diabetes <7 0% % Final    EAG 05/19/2021 114  mg/dl Final         Patient Instructions   Increase your amlodipine 5 mg daily  Continue your metoprolol and lisinopril same  I would like to see back in a month to recheck your blood pressure keep on checking your blood pressure  No blood test prior to next visit  Follow with Consultants as per their and our suggestion    Follow up in one month or as needed earlier    Follow all instructions as advised and discussed  Take your medications as prescribed  Call the office immediately if you experience any side effects  Ask questions if you do not understand  Keep your scheduled appointment as advised or come sooner if necessary or in doubt  Best time to call for non-urgent matter or questions on weekdays is between 9am and 12 noon  See physician for any new symptoms or worsening of current symptoms  Urgent or emergent situations call 911 and report to nearest emergency room      I spent  30 -40 minutes taking care of this patient including clinical care, conseling, collaboration, chart, lab and consultaion review as appropriate    Patient is to get labs 1 week(s) prior to next visit if advised Dr Jadyn Joya MD  [facility]       "This note has been constructed using a voice recognition system  Therefore there may be syntax, spelling, and/or grammatical errors   Please call if you have any questions  "

## 2021-06-24 NOTE — ASSESSMENT & PLAN NOTE
Continue metoprolol same as well as lisinopril 20 mg same  However I like to increase your amlodipine 5 mg every day    Hypertension( High Blood Pressure ):    Continue Home BP check daily and bring log, if you are not checking consider checking daily    Take your Blood Pressure medicine as advised    Do not take your Blood pressure medicine if systolic Blood Pressure (top number) is less than 100 or heart rate less than 60  Notify you physician

## 2021-06-24 NOTE — PATIENT INSTRUCTIONS
Increase your amlodipine 5 mg daily  Continue your metoprolol and lisinopril same  I would like to see back in a month to recheck your blood pressure keep on checking your blood pressure  No blood test prior to next visit  Follow with Consultants as per their and our suggestion    Follow up in one month or as needed earlier    Follow all instructions as advised and discussed  Take your medications as prescribed  Call the office immediately if you experience any side effects  Ask questions if you do not understand  Keep your scheduled appointment as advised or come sooner if necessary or in doubt  Best time to call for non-urgent matter or questions on weekdays is between 9am and 12 noon  See physician for any new symptoms or worsening of current symptoms  Urgent or emergent situations call 911 and report to nearest emergency room      I spent  30 -40 minutes taking care of this patient including clinical care, conseling, collaboration, chart, lab and consultaion review as appropriate    Patient is to get labs 1 week(s) prior to next visit if advised

## 2021-07-19 ENCOUNTER — TELEPHONE (OUTPATIENT)
Dept: INTERNAL MEDICINE CLINIC | Facility: CLINIC | Age: 86
End: 2021-07-19

## 2021-07-19 NOTE — TELEPHONE ENCOUNTER
Pt called to cancer her 7-22-21 appt  She did not want to reschedule  [facility] end of August to reschedule

## 2021-08-23 ENCOUNTER — OFFICE VISIT (OUTPATIENT)
Dept: PODIATRY | Facility: CLINIC | Age: 86
End: 2021-08-23
Payer: MEDICARE

## 2021-08-23 VITALS — RESPIRATION RATE: 17 BRPM | BODY MASS INDEX: 30.23 KG/M2 | HEIGHT: 60 IN | WEIGHT: 154 LBS

## 2021-08-23 DIAGNOSIS — I73.9 PERIPHERAL VASCULAR DISEASE, UNSPECIFIED (HCC): Primary | ICD-10-CM

## 2021-08-23 DIAGNOSIS — M79.672 PAIN IN BOTH FEET: ICD-10-CM

## 2021-08-23 DIAGNOSIS — M79.671 PAIN IN BOTH FEET: ICD-10-CM

## 2021-08-23 DIAGNOSIS — B35.1 ONYCHOMYCOSIS: ICD-10-CM

## 2021-08-23 PROCEDURE — 11721 DEBRIDE NAIL 6 OR MORE: CPT | Performed by: PODIATRIST

## 2021-09-08 NOTE — PROGRESS NOTES
Assessment/Plan:    Patient opted out of oral and topical anti fungal medications at this time, all onychomycotic dystrophic nails debrided using sterile Nipper and electrical marta to patient tolerance, Betadine applied, patient educated on daily foot hygiene for the management of fungal infection  Diagnoses and all orders for this visit:    Peripheral vascular disease, unspecified ([facility] [address] )    Onychomycosis    Pain in both feet          Subjective:      Patient ID: Alvino Oakley is a 80 y o  female  61-year-old female past medical history significant of foot osteoporosis and osteoarthritis presents to the office with chronic history of painful elongated thickened discolored dystrophic toenails, she failed treatment due to her comorbidities, patient denies constitutional symptoms patient denies trauma the foot, patient states that the nails started affecting her ambulation causing pain in shoe gear and upon pressure      The following portions of the patient's history were reviewed and updated as appropriate: allergies, current medications, past family history, past medical history, past social history, past surgical history and problem list     Review of Systems   Constitutional: Negative  Respiratory: Negative  Cardiovascular: Positive for leg swelling  Musculoskeletal: Positive for arthralgias and joint swelling  Skin: Positive for color change  Historical Information   No past medical history on file  No past surgical history on file  Social History   Social History     Substance and Sexual Activity   Alcohol Use None     Social History     Substance and Sexual Activity   Drug Use Not on file     Social History     Tobacco Use   Smoking Status Former Smoker   Smokeless Tobacco Never Used     Family History: No family history on file      Meds/Allergies   all medications and allergies reviewed  No Known Allergies    Objective:      Resp 17   Ht 5' (1 524 m)   Wt 69 9 kg (154 lb) BMI 30 08 kg/m²          Physical Exam  Constitutional:       General: She is not in acute distress  Appearance: She is well-developed  She is not ill-appearing, toxic-appearing or diaphoretic  HENT:      Head: Normocephalic  Cardiovascular:      Pulses:           Dorsalis pedis pulses are 1+ on the right side and 1+ on the left side  Posterior tibial pulses are 0 on the right side and 0 on the left side  Comments: Palpable DP pulse, nonpalpable PT pulse, CFT is less than 3 seconds, temperature gradient within normal limit, a trophic skin changes noted with skin thinning in shiny, patient report occasional claudication to bilateral calf, localized edema foot and ankle Q9  Pulmonary:      Effort: Pulmonary effort is normal    Musculoskeletal:         General: Tenderness and deformity present  Comments: Pes planus type foot pain on palpation and range of motion of the 1st MPJ, metatarsal heads bilateral foot, pain on palpation on range of motion of the ST joint, crepitus noted in midfoot joint  Skin:     General: Skin is dry  Capillary Refill: Capillary refill takes 2 to 3 seconds  Comments: Trophic skin changes bilateral foot and ankle, alternating hypopigmentation and hyperpigmentation patches around the foot and ankle on anterior leg, skin is shiny and thin, absent hair growth, atrophy of fat pad  Multiple callus formation plantar foot painful on palpation to plantar heel bilateral, skin lines absent, hyperkeratotic rim and central atrophy noted  Thickened dystrophic discolored toenails of bilateral hallux, 5th digit bilateral, subungual debris and painful upon palpation, all other nails show signs of dystrophy with no subungual debris, all nails have malodor  Neurological:      Mental Status: She is alert and oriented to person, place, and time  Sensory: Sensory deficit present  Motor: Atrophy present        Deep Tendon Reflexes: Reflexes abnormal       Foot Exam    Right Foot/Ankle     Neurovascular  Dorsalis pedis: 1+  Posterior tibial: 0      Left Foot/Ankle      Neurovascular  Dorsalis pedis: 1+  Posterior tibial: 0

## 2021-09-08 NOTE — PROGRESS NOTES
Assessment/Plan:    Patient opted out of oral and topical anti fungal medications at this time, all onychomycotic dystrophic nails debrided using sterile Nipper and electrical marta to patient tolerance, Betadine applied, patient educated on daily foot hygiene for the management of fungal infection  Diagnoses and all orders for this visit:    Onychomycosis    Pain in both feet    Peripheral vascular disease, unspecified ([facility] [address] )          Subjective:      Patient ID: Star Montoya is a 80 y o  female  27-year-old female past medical history significant of foot osteoporosis and osteoarthritis presents to the office with chronic history of painful elongated thickened discolored dystrophic toenails, she failed treatment due to her comorbidities, patient denies constitutional symptoms patient denies trauma the foot, patient states that the nails started affecting her ambulation causing pain in shoe gear and upon pressure      The following portions of the patient's history were reviewed and updated as appropriate: allergies, current medications, past family history, past medical history, past social history, past surgical history and problem list     Review of Systems   Constitutional: Negative  Respiratory: Negative  Cardiovascular: Positive for leg swelling  Musculoskeletal: Positive for arthralgias and joint swelling  Skin: Positive for color change  Historical Information   No past medical history on file  No past surgical history on file  Social History   Social History     Substance and Sexual Activity   Alcohol Use None     Social History     Substance and Sexual Activity   Drug Use Not on file     Social History     Tobacco Use   Smoking Status Former Smoker   Smokeless Tobacco Never Used     Family History: No family history on file      Meds/Allergies   all medications and allergies reviewed  No Known Allergies    Objective:      /60   Pulse 62   Resp 17   Ht 5' (1 524 m) Wt 69 9 kg (154 lb)   BMI 30 08 kg/m²          Physical Exam  Constitutional:       General: She is not in acute distress  Appearance: She is well-developed  She is not ill-appearing, toxic-appearing or diaphoretic  HENT:      Head: Normocephalic  Cardiovascular:      Pulses:           Dorsalis pedis pulses are 1+ on the right side and 1+ on the left side  Posterior tibial pulses are 0 on the right side and 0 on the left side  Comments: Palpable DP pulse, nonpalpable PT pulse, CFT is less than 3 seconds, temperature gradient within normal limit, a trophic skin changes noted with skin thinning in shiny, patient report occasional claudication to bilateral calf, localized edema foot and ankle Q9  Pulmonary:      Effort: Pulmonary effort is normal    Musculoskeletal:         General: Tenderness and deformity present  Comments: Pes planus type foot pain on palpation and range of motion of the 1st MPJ, metatarsal heads bilateral foot, pain on palpation on range of motion of the ST joint, crepitus noted in midfoot joint  Skin:     General: Skin is dry  Capillary Refill: Capillary refill takes 2 to 3 seconds  Comments: Trophic skin changes bilateral foot and ankle, alternating hypopigmentation and hyperpigmentation patches around the foot and ankle on anterior leg, skin is shiny and thin, absent hair growth, atrophy of fat pad  Multiple callus formation plantar foot painful on palpation to plantar heel bilateral, skin lines absent, hyperkeratotic rim and central atrophy noted  Thickened dystrophic discolored toenails of bilateral hallux, 5th digit bilateral, subungual debris and painful upon palpation, all other nails show signs of dystrophy with no subungual debris, all nails have malodor  Neurological:      Mental Status: She is alert and oriented to person, place, and time  Sensory: Sensory deficit present  Motor: Atrophy present        Deep Tendon Reflexes: Reflexes abnormal       Foot Exam    Right Foot/Ankle     Neurovascular  Dorsalis pedis: 1+  Posterior tibial: 0      Left Foot/Ankle      Neurovascular  Dorsalis pedis: 1+  Posterior tibial: 0

## 2021-10-03 DIAGNOSIS — I10 ESSENTIAL HYPERTENSION: ICD-10-CM

## 2021-10-03 DIAGNOSIS — J45.40 MODERATE PERSISTENT ASTHMA, UNSPECIFIED WHETHER COMPLICATED: ICD-10-CM

## 2021-10-04 RX ORDER — LISINOPRIL 20 MG/1
TABLET ORAL
Qty: 90 TABLET | Refills: 0 | Status: SHIPPED | OUTPATIENT
Start: 2021-10-04

## 2021-10-04 RX ORDER — METOPROLOL TARTRATE 100 MG/1
TABLET ORAL
Qty: 135 TABLET | Refills: 0 | Status: SHIPPED | OUTPATIENT
Start: 2021-10-04

## 2021-10-04 RX ORDER — MONTELUKAST SODIUM 10 MG/1
TABLET ORAL
Qty: 90 TABLET | Refills: 0 | Status: SHIPPED | OUTPATIENT
Start: 2021-10-04